# Patient Record
Sex: MALE | Race: BLACK OR AFRICAN AMERICAN | NOT HISPANIC OR LATINO | Employment: FULL TIME | URBAN - METROPOLITAN AREA
[De-identification: names, ages, dates, MRNs, and addresses within clinical notes are randomized per-mention and may not be internally consistent; named-entity substitution may affect disease eponyms.]

---

## 2018-04-27 ENCOUNTER — HOSPITAL ENCOUNTER (EMERGENCY)
Facility: CLINIC | Age: 28
Discharge: HOME | End: 2018-04-27
Attending: PHYSICIAN ASSISTANT | Admitting: PHYSICIAN ASSISTANT

## 2018-04-27 VITALS
WEIGHT: 185 LBS | HEART RATE: 77 BPM | DIASTOLIC BLOOD PRESSURE: 85 MMHG | SYSTOLIC BLOOD PRESSURE: 145 MMHG | RESPIRATION RATE: 18 BRPM | BODY MASS INDEX: 28.04 KG/M2 | OXYGEN SATURATION: 98 % | TEMPERATURE: 98.5 F | HEIGHT: 68 IN

## 2018-04-27 DIAGNOSIS — S05.01XA ABRASION OF RIGHT CORNEA, INITIAL ENCOUNTER: ICD-10-CM

## 2018-04-27 PROCEDURE — 25000125 ZZHC RX 250: Performed by: PHYSICIAN ASSISTANT

## 2018-04-27 PROCEDURE — 99283 EMERGENCY DEPT VISIT LOW MDM: CPT

## 2018-04-27 RX ORDER — ERYTHROMYCIN 5 MG/G
1 OINTMENT OPHTHALMIC AT BEDTIME
Qty: 1 G | Refills: 0 | Status: SHIPPED | OUTPATIENT
Start: 2018-04-27

## 2018-04-27 RX ORDER — TOBRAMYCIN 3 MG/ML
1 SOLUTION/ DROPS OPHTHALMIC 4 TIMES DAILY
Qty: 1 BOTTLE | Refills: 0 | Status: SHIPPED | OUTPATIENT
Start: 2018-04-27

## 2018-04-27 RX ORDER — PROPARACAINE HYDROCHLORIDE 5 MG/ML
2 SOLUTION/ DROPS OPHTHALMIC ONCE
Status: COMPLETED | OUTPATIENT
Start: 2018-04-27 | End: 2018-04-27

## 2018-04-27 RX ORDER — HYDROCODONE BITARTRATE AND ACETAMINOPHEN 5; 325 MG/1; MG/1
1 TABLET ORAL EVERY 6 HOURS PRN
Qty: 8 TABLET | Refills: 0 | Status: SHIPPED | OUTPATIENT
Start: 2018-04-27

## 2018-04-27 RX ADMIN — PROPARACAINE HYDROCHLORIDE 2 DROP: 5 SOLUTION/ DROPS OPHTHALMIC at 17:30

## 2018-04-27 ASSESSMENT — ENCOUNTER SYMPTOMS
EYE PAIN: 1
PHOTOPHOBIA: 0
EYE ITCHING: 0
EYE DISCHARGE: 1
EYE REDNESS: 1

## 2018-04-27 ASSESSMENT — VISUAL ACUITY
OD: 20/25
OS: 20/25

## 2018-04-27 NOTE — ED PROVIDER NOTES
"Emergency Department Attending Supervision Note  4/27/2018  6:08 PM      I evaluated this patient in conjunction with Therese Hughes PA-C.       Briefly, the patient presented with right eye pain. The patient reports he was at work today when got a piece of sheet rock in his left eye which irritated it, prompting him to present to the emergency department. He was not wearing eye protection when he injured his eye. He does not wear contacts or glasses and denies visual disturbances.       On my exam,   /85  Pulse 77  Temp 98.5  F (36.9  C) (Oral)  Resp 18  Ht 1.727 m (5' 8\")  Wt 83.9 kg (185 lb)  SpO2 98%  BMI 28.13 kg/m2  General: Resting comfortably on the gurney  Head:  The scalp, face, and head appear normal  Eyes:  The pupils are normal. PERRL.  Right cornea with multiple abrasions confirmed with fluorescein exam.  No foreign body detected on eversion of upper eyelid.  Left eye normal, no evidence of conjunctivitis.  Clear tears from right eye. No cell and flare visualized to anterior chamber.  Visual Acuity Screening:  Right Eye: 20/25  Left Eye: 20/25  ENT:    The nose is normal    Ears/pinnae are normal  Neck:  Normal range of motion  MS:  No deformities.  Skin:  No rash or lesions noted.  Neuro: Speech is normal and fluent  Psych:  Awake. Alert.  Normal affect.      Appropriate interactions  ED Course:  MEDICAL DECISION MAKING:   Gerson Benson Jr. is a 27 year old male who presents with eye discomfort. The exam is significant for abnormality on fluorescein exam. This is consistent with corneal abrasion. No foreign bodies in eyes or lids noted.  No corneal ulcers. No signs of retinal abnormalities, open globe, or other serious eye disease.  No definite signs of anterior chamber involvement such as endopthalmitis at this point.    IMPRESSION:   1.  Corneal Abrasion, right eye  2.  Suspected foreign body to right eye, no longer present    PLAN:   1. Artificial tears six times daily  2. Antibiotic " eye drops  3. Abx ointment for nighttime  4. Oral pain medications.  5. Close f/u with eye clinic and return if worsens    I agree with the plan as delineated and Therese Hughes's, PAs chart.    Diagnosis    ICD-10-CM    1. Abrasion of right cornea, initial encounter S05.01XA        Jose Wilsno MD White, Scott, MD  04/27/18 1858

## 2018-04-27 NOTE — ED AVS SNAPSHOT
Emergency Department    [address] [facility] [address]    Phone:  [phone]    Fax:  [phone]                                       Gerson Benson Jr.   MRN: [id number]    Department:   Emergency Department   Date of Visit:  4/27/2018           Patient Information     Date Of Birth          1990        Your diagnoses for this visit were:     Abrasion of right cornea, initial encounter        You were seen by Therese Hughes PA-C and Jose Wilson MD.      Follow-up Information     Follow up with Charles Quezada MD In 2 days.    Specialty:  Ophthalmology    Why:  recheck    Contact information:    [facility]  [address]     [facility] [address] [phone]          Follow up with  Emergency Department.    Specialty:  EMERGENCY MEDICINE    Why:  If symptoms worsen    Contact information:    [address] [facility] [id number] [phone]        Discharge Instructions       Discharge Instructions  Corneal Abrasion    Today you were treated for a scratch on the cornea of your eye, or a corneal abrasion.  The cornea is the clear layer of tissue that covers the colored part of your eye. Corneal abrasions are caused when something scratches your eye such as fingernails, animal paws, branches, pieces of paper, tiny pieces of rust, wood, glass, plastic or contact lenses. Corneal abrasions often make people feel like there is a speck of sand in the eye.  These abrasions also can cause severe eye pain, watery eyes, blurred vision and pain with bright light.      Generally, every Emergency Department visit should have a follow-up clinic visit with either a primary or a specialty clinic/provider. Please follow-up as instructed by your emergency provider today.    Return to the Emergency Department if:    Your vision worsens.    The appearance of your eye concerns you.    Anything else concerns you.    Treatment:    Tylenol   (acetaminophen), Motrin  (ibuprofen), or Advil  (ibuprofen) will help with the pain from the abrasion.      Use the antibiotic eye ointment or drops as directed until the antibiotics are finished.    Do not wear contacts until the antibiotic is finished.    Do not patch your eye, because this can increase your risk for infection.  Your symptoms should improve gradually over the next 2 days.  If they are not improving, it is very important that you see an eye provider right away.  If over the next few days, the pain is getting worse, you have increasing difficulty with vision or you have yellow drainage from your eye, you need to see the eye provider that day.  If you have difficulty getting in to see an eye provider, please return to an Urgent Care or Emergency Department for further evaluation and treatment.    If you were given a prescription for medicine here today, be sure to read all of the information (including the package insert) that comes with your prescription.  This will include important information about the medicine, its side effects, and any warnings that you need to know about.  The pharmacist who fills the prescription can provide more information and answer questions you may have about the medicine.  If you have questions or concerns that the pharmacist cannot address, please call or return to the Emergency Department.   Remember that you can always come back to the Emergency Department if you are not able to see your regular provider in the amount of time listed above, if you get any new symptoms, or if there is anything that worries you.    Foreign Object in the Cornea    Your cornea is the clear layer on the front of your eyeball. It focuses light and helps protect your eye from dust and germs. A foreign object can get into the cornea itself. A trapped speck of dirt or grit is often a minor problem. But anything metal, or an object that goes through (pierces) your cornea, can cause severe  damage. For example, the cornea can be damages from foreign bodies that occur while grinding metal. The small pieces of metal travel toward the eye at high speed.  When to go to the emergency room (ER)  The longer you wait, the greater the chance of injury or infection. Seek emergency medical help right away for any of the following:    An object in your eye that you can't flush out with water    Your eye remains very swollen or painful after an object has been removed    An object embedded in your eye--cover both eyes with a sterile compress and call 911    The front of your eye (cornea) is white or hazy    Blood in your eye (hyphema)    You are having trouble seeing  What to expect in the ER    A healthcare provider will ask about your injury and examine your eye.    You may be given eye drops to ease any mild pain.    The provider will use a microscope with a bright light to help examine your eyeball. He or she may put a special dye (fluorescein dye) on the cornea to help see the object more clearly.    The provider may remove a loose foreign object.    Severe injuries are likely to be treated by an eye specialist (ophthalmologist).    Antibiotic eye drops and possibly pain medicine will be prescribed if you are discharged home  Follow-up  Call your healthcare provider if you notice any of these symptoms after going home:    Fever of 100.4 F (38 C) or higher, or as directed by your healthcare provider    Increased redness or eye pain    Drainage from your eye    Blurred or decreased vision  Date Last Reviewed: 5/1/2017 2000-2017 [employer]. [address] [place] [address]. All rights reserved. This information is not intended as a substitute for professional medical care. Always follow your healthcare professional's instructions.          24 Hour Appointment Hotline       To make an appointment at any [facility], call [phone] ([phone]). If you don't have a family  doctor or clinic, we will help you find one. [facility] clinics are conveniently located to serve the needs of you and your family.             Review of your medicines      START taking        Dose / Directions Last dose taken    erythromycin ophthalmic ointment   Commonly known as:  ROMYCIN   Dose:  1 Application   Quantity:  1 g        Place 1 Application into the right eye At Bedtime   Refills:  0        HYDROcodone-acetaminophen 5-325 MG per tablet   Commonly known as:  NORCO   Dose:  1 tablet   Quantity:  8 tablet        Take 1 tablet by mouth every 6 hours as needed for pain   Refills:  0        tobramycin 0.3 % ophthalmic solution   Commonly known as:  TOBREX   Dose:  1 drop   Quantity:  1 Bottle        Place 1 drop into the right eye 4 times daily   Refills:  0          Our records show that you are taking the medicines listed below. If these are incorrect, please call your family doctor or clinic.        Dose / Directions Last dose taken    order for DME   Quantity:  1        nebulizer   Refills:  0                Information about OPIOIDS     PRESCRIPTION OPIOIDS: WHAT YOU NEED TO KNOW   You have a prescription for an opioid (narcotic) pain medicine. Opioids can cause addiction. If you have a history of chemical dependency of any type, you are at a higher risk of becoming addicted to opioids. Only take this medicine after all other options have been tried. Take it for as short a time and as few doses as possible.     Do not:    Drive. If you drive while taking these medicines, you could be arrested for driving under the influence (DUI).    Operate heavy machinery    Do any other dangerous activities while taking these medicines.     Drink any alcohol while taking these medicines.      Take with any other medicines that contain acetaminophen. Read all labels carefully. Look for the word  acetaminophen  or  Tylenol.  Ask your pharmacist if you have questions or are unsure.    Store your pills in a secure  place, locked if possible. We will not replace any lost or stolen medicine. If you don t finish your medicine, please throw away (dispose) as directed by your pharmacist. The [place] [employer]ution [employer] Agency has more information about safe disposal: https://www.pca.[facility].mn.us/living-green/managing-unwanted-medications    All opioids tend to cause constipation. Drink plenty of water and eat foods that have a lot of fiber, such as fruits, vegetables, prune juice, apple juice and high-fiber cereal. Take a laxative (Miralax, milk of magnesia, Colace, Senna) if you don t move your bowels at least every other day.         Prescriptions were sent or printed at these locations (3 Prescriptions)                   Other Prescriptions                Printed at Department/Unit printer (3 of 3)         erythromycin (ROMYCIN) ophthalmic ointment               HYDROcodone-acetaminophen (NORCO) 5-325 MG per tablet               tobramycin (TOBREX) 0.3 % ophthalmic solution                Orders Needing Specimen Collection     None      Pending Results     No orders found from 4/25/2018 to 4/28/2018.            Pending Culture Results     No orders found from 4/25/2018 to 4/28/2018.            Pending Results Instructions     If you had any lab results that were not finalized at the time of your Discharge, you can call the ED Lab Result RN at [phone]. You will be contacted by this team for any positive Lab results or changes in treatment. The nurses are available 7 days a week from 10A to 6:30P.  You can leave a message 24 hours per day and they will return your call.        Test Results From Your Hospital Stay               Clinical Quality Measure: Blood Pressure Screening     Your blood pressure was checked while you were in the emergency department today. The last reading we obtained was  BP: 145/85 . Please read the guidelines below about what these numbers mean and what you should do about them.  If your systolic blood  "pressure (the top number) is less than 120 and your diastolic blood pressure (the bottom number) is less than 80, then your blood pressure is normal. There is nothing more that you need to do about it.  If your systolic blood pressure (the top number) is 120-139 or your diastolic blood pressure (the bottom number) is 80-89, your blood pressure may be higher than it should be. You should have your blood pressure rechecked within a year by a primary care provider.  If your systolic blood pressure (the top number) is 140 or greater or your diastolic blood pressure (the bottom number) is 90 or greater, you may have high blood pressure. High blood pressure is treatable, but if left untreated over time it can put you at risk for heart attack, stroke, or kidney failure. You should have your blood pressure rechecked by a primary care provider within the next 4 weeks.  If your provider in the emergency department today gave you specific instructions to follow-up with your doctor or provider even sooner than that, you should follow that instruction and not wait for up to 4 weeks for your follow-up visit.        Thank you for choosing [facility]       Thank you for choosing [facility] for your care. Our goal is always to provide you with excellent care. Hearing back from our patients is one way we can continue to improve our services. Please take a few minutes to complete the written survey that you may receive in the mail after you visit with us. Thank you!        [employer] Information     [employer] lets you send messages to your doctor, view your test results, renew your prescriptions, schedule appointments and more. To sign up, go to [email or web address] . Click on \"Log in\" on the left side of the screen, which will take you to the Welcome page. Then click on \"Sign up Now\" on the right side of the page.     You will be asked to enter the access code listed below, as well as some personal information. Please follow the " directions to create your username and password.     Your access code is: [email or web address]  Expires: 2018  6:31 PM     Your access code will  in 90 days. If you need help or a new code, please call your [facility] clinic or [phone].        Care EveryWhere ID     This is your Care EveryWhere ID. This could be used by other organizations to access your [facility] medical records  [id number]        Equal Access to Services     JEANNETTE YOO : Hadii caden burrows hadasho Soomaali, waaxda luqadaha, qaybta kaalmada adeegyada, toan archer . So [facility] [phone].    ATENCIÓN: Si habla español, tiene a salmeron disposición servicios gratuitos de asistencia lingüística. Llame al [phone].    We comply with applicable federal civil rights laws and [place] laws. We do not discriminate on the basis of race, color, national origin, age, disability, sex, sexual orientation, or gender identity.            After Visit Summary       This is your record. Keep this with you and show to your community pharmacist(s) and doctor(s) at your next visit.

## 2018-04-27 NOTE — ED AVS SNAPSHOT
Emergency Department    [address] [place] [address]    Phone:  [phone]    Fax:  [phone]                                       Gerson Benson Jr.   MRN: [id number]    Department:   Emergency Department   Date of Visit:  4/27/2018           After Visit Summary Signature Page     I have received my discharge instructions, and my questions have been answered. I have discussed any challenges I see with this plan with the nurse or doctor.    ..........................................................................................................................................  Patient/Patient Representative Signature      ..........................................................................................................................................  Patient Representative Print Name and Relationship to Patient    ..................................................               ................................................  Date                                            Time    ..........................................................................................................................................  Reviewed by Signature/Title    ...................................................              ..............................................  Date                                                            Time

## 2018-04-27 NOTE — DISCHARGE INSTRUCTIONS
Discharge Instructions  Corneal Abrasion    Today you were treated for a scratch on the cornea of your eye, or a corneal abrasion.  The cornea is the clear layer of tissue that covers the colored part of your eye. Corneal abrasions are caused when something scratches your eye such as fingernails, animal paws, branches, pieces of paper, tiny pieces of rust, wood, glass, plastic or contact lenses. Corneal abrasions often make people feel like there is a speck of sand in the eye.  These abrasions also can cause severe eye pain, watery eyes, blurred vision and pain with bright light.      Generally, every Emergency Department visit should have a follow-up clinic visit with either a primary or a specialty clinic/provider. Please follow-up as instructed by your emergency provider today.    Return to the Emergency Department if:    Your vision worsens.    The appearance of your eye concerns you.    Anything else concerns you.    Treatment:    Tylenol  (acetaminophen), Motrin  (ibuprofen), or Advil  (ibuprofen) will help with the pain from the abrasion.      Use the antibiotic eye ointment or drops as directed until the antibiotics are finished.    Do not wear contacts until the antibiotic is finished.    Do not patch your eye, because this can increase your risk for infection.  Your symptoms should improve gradually over the next 2 days.  If they are not improving, it is very important that you see an eye provider right away.  If over the next few days, the pain is getting worse, you have increasing difficulty with vision or you have yellow drainage from your eye, you need to see the eye provider that day.  If you have difficulty getting in to see an eye provider, please return to an Urgent Care or Emergency Department for further evaluation and treatment.    If you were given a prescription for medicine here today, be sure to read all of the information (including the package insert) that comes with your prescription.   This will include important information about the medicine, its side effects, and any warnings that you need to know about.  The pharmacist who fills the prescription can provide more information and answer questions you may have about the medicine.  If you have questions or concerns that the pharmacist cannot address, please call or return to the Emergency Department.   Remember that you can always come back to the Emergency Department if you are not able to see your regular provider in the amount of time listed above, if you get any new symptoms, or if there is anything that worries you.    Foreign Object in the Cornea    Your cornea is the clear layer on the front of your eyeball. It focuses light and helps protect your eye from dust and germs. A foreign object can get into the cornea itself. A trapped speck of dirt or grit is often a minor problem. But anything metal, or an object that goes through (pierces) your cornea, can cause severe damage. For example, the cornea can be damages from foreign bodies that occur while grinding metal. The small pieces of metal travel toward the eye at high speed.  When to go to the emergency room (ER)  The longer you wait, the greater the chance of injury or infection. Seek emergency medical help right away for any of the following:    An object in your eye that you can't flush out with water    Your eye remains very swollen or painful after an object has been removed    An object embedded in your eye--cover both eyes with a sterile compress and call 911    The front of your eye (cornea) is white or hazy    Blood in your eye (hyphema)    You are having trouble seeing  What to expect in the ER    A healthcare provider will ask about your injury and examine your eye.    You may be given eye drops to ease any mild pain.    The provider will use a microscope with a bright light to help examine your eyeball. He or she may put a special dye (fluorescein dye) on the cornea to help see  the object more clearly.    The provider may remove a loose foreign object.    Severe injuries are likely to be treated by an eye specialist (ophthalmologist).    Antibiotic eye drops and possibly pain medicine will be prescribed if you are discharged home  Follow-up  Call your healthcare provider if you notice any of these symptoms after going home:    Fever of 100.4 F (38 C) or higher, or as directed by your healthcare provider    Increased redness or eye pain    Drainage from your eye    Blurred or decreased vision  Date Last Reviewed: 5/1/2017 2000-2017 [employer]. [address] [place] [id number]. All rights reserved. This information is not intended as a substitute for professional medical care. Always follow your healthcare professional's instructions.

## 2018-04-27 NOTE — ED PROVIDER NOTES
"  History     Chief Complaint:  Foreign Body in Eye    HPI   Gerson Benson Jr. is a 27 year old male who presents with a foreign body in the eye. 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 He states he was moving the sheet rock when he had invitation of eye pain. He states this occurred at approximately 1330 today and that he has attempted to dislodge the foreign body by washing his eye with water, solution, and inverting his eye. On presentation his right eye is red, painful, and discharging clear fluid. He does not use corrective lenses and he denies any loss of vision, fever, and denies any other symptoms/injuries.     Allergies:  No known drug allergies.     Medications:    The patient is not currently taking any prescribed medications.     Past Medical History:    Asthma    Past Surgical History:    No pertinent past surgical history.    Family History:    No pertinent family history.    Social History:  Smoking status: Never smoker (Passive Exposure)  Alcohol use: Unknown   Marital Status:  Unknown     Review of Systems   Eyes: Positive for pain, discharge and redness. Negative for photophobia, itching and visual disturbance.   All other systems reviewed and are negative.    Physical Exam     Patient Vitals for the past 24 hrs:   BP Temp Temp src Pulse Resp SpO2 Height Weight   04/27/18 1717 145/85 98.5  F (36.9  C) Oral 77 16 98 % 1.727 m (5' 8\") 83.9 kg (185 lb)         Visual Acuity-Left: 20/25  Visual Acuity-Right: 20/25      Physical Exam  General: Appears uncomfortable with obvious right eye tearing. Alert and oriented.  Head:  The scalp, face, and head appear normal.  Eye:  Extraocular eye movements intact. Pupils are equal, round, and reactive to light. No hyphema. Negative Lee's sign. Lids everted and no foreign body identified. Multiple corneal abrasions noted, especially to the left aspect of " the cornea. No dendritic lesions. No ulcer. Globe is firm upon palpation.  CV:  Regular rate and rhythm     Normal S1/S2    No pathological murmur detected  Resp:  Lungs are clear to auscultation    Non-labored    No rales or wheezing  Skin:  No rash or acute skin lesions noted    Emergency Department Course   Interventions:  (1730) Proparacaine 0.5% ophthalmic solution, 2 drops, Right eye    Emergency Department Course:  Nursing notes and vitals reviewed.  (1738) I performed an exam of the patient as documented above.    Findings and plan explained to the patient. Patient discharged home with instructions regarding supportive care, medications, and reasons to return. The importance of close follow-up was reviewed. The patient was prescribed Erythromycin, Norco, Tobramycin.   Impression & Plan    Medical Decision Making:  Gerson Benson Jr. is a 27 year old male who presents for evaluation of a foreign body in his eye. He presents vitally stable and afebrile. Visual acuity is 20/25 bilaterally. The lids were everted and there is no obvious foreign body identified. No hyphema. Negative Lee's sign. However, there was a large number of corneal abrasions noted that would suggest foreign body was present at one time. Patient does attest to washing his eye out prior to coming in, I believe the foreign body may have been dislodged during this time, and the patient is symptomatic from the large amount of corneal abrasions present. Dr. Wilson was consulted and agreed to see this patient with me. Please refer to his note for further details. He agrees and does not see a foreign body and sees a large amount of abrasions as well. Plan is to send patient with Tobramycin drops as well as Erythromycin at bed time. He is also prescribed Norco for pain control. He was asked to follow up with ophthalmology in 1-2 days for recheck. He was asked to return immediately for periorbital edema, fever, uncontrolled pain, visual changes, or  any other concerning symptoms. All patients questions were answered prior to discharge. Patient agrees and understands this plan.    Diagnosis:    ICD-10-CM   1. Abrasion of right cornea, initial encounter S05.01XA       Disposition:  Patient is discharged to home.      Discharge Medications:  New Prescriptions    ERYTHROMYCIN (ROMYCIN) OPHTHALMIC OINTMENT    Place 1 Application into the right eye At Bedtime    HYDROCODONE-ACETAMINOPHEN (NORCO) 5-325 MG PER TABLET    Take 1 tablet by mouth every 6 hours as needed for pain    TOBRAMYCIN (TOBREX) 0.3 % OPHTHALMIC SOLUTION    Place 1 drop into the right eye 4 times daily         Kirk GRIFFIN am serving as a scribe on 4/27/2018 at 5:38 PM to personally document services performed by Therese Hughes PA-C based on my observations and the provider's statements to me.          Kirk Sosa  4/27/2018    EMERGENCY DEPARTMENT       Therese Hughes PA-C  04/27/18 2029